# Patient Record
Sex: MALE | Race: BLACK OR AFRICAN AMERICAN | NOT HISPANIC OR LATINO | URBAN - METROPOLITAN AREA
[De-identification: names, ages, dates, MRNs, and addresses within clinical notes are randomized per-mention and may not be internally consistent; named-entity substitution may affect disease eponyms.]

---

## 2023-10-17 ENCOUNTER — PHARMACY VISIT (OUTPATIENT)
Dept: PHARMACY | Facility: CLINIC | Age: 53
End: 2023-10-17
Payer: MEDICAID

## 2023-10-17 ENCOUNTER — TELEPHONE (OUTPATIENT)
Dept: NEUROLOGY | Facility: CLINIC | Age: 53
End: 2023-10-17
Payer: COMMERCIAL

## 2023-10-17 DIAGNOSIS — G40.909 UNCLASSIFIED EPILEPTIC SEIZURES (MULTI): Primary | ICD-10-CM

## 2023-10-17 PROCEDURE — RXMED WILLOW AMBULATORY MEDICATION CHARGE

## 2023-10-17 RX ORDER — LACOSAMIDE 200 MG/1
TABLET, FILM COATED ORAL 2 TIMES DAILY
Qty: 60 TABLET | Refills: 2 | Status: SHIPPED | OUTPATIENT
Start: 2023-10-17 | End: 2023-12-08

## 2023-11-04 DIAGNOSIS — G40.909 EPILEPSY, UNSPECIFIED, NOT INTRACTABLE, WITHOUT STATUS EPILEPTICUS (MULTI): ICD-10-CM

## 2023-11-07 RX ORDER — LEVETIRACETAM 750 MG/1
1500 TABLET, FILM COATED ORAL 2 TIMES DAILY
Qty: 120 TABLET | Refills: 11 | Status: SHIPPED | OUTPATIENT
Start: 2023-11-07 | End: 2024-04-24 | Stop reason: SDUPTHER

## 2023-11-16 ENCOUNTER — PHARMACY VISIT (OUTPATIENT)
Dept: PHARMACY | Facility: CLINIC | Age: 53
End: 2023-11-16
Payer: MEDICAID

## 2023-11-16 PROCEDURE — RXMED WILLOW AMBULATORY MEDICATION CHARGE

## 2023-12-15 ENCOUNTER — PHARMACY VISIT (OUTPATIENT)
Dept: PHARMACY | Facility: CLINIC | Age: 53
End: 2023-12-15
Payer: MEDICAID

## 2023-12-15 DIAGNOSIS — G40.409 OTHER GENERALIZED EPILEPSY AND EPILEPTIC SYNDROMES, NOT INTRACTABLE, WITHOUT STATUS EPILEPTICUS (MULTI): Primary | ICD-10-CM

## 2023-12-15 PROCEDURE — RXMED WILLOW AMBULATORY MEDICATION CHARGE

## 2023-12-15 RX ORDER — LACOSAMIDE 200 MG/1
200 TABLET, FILM COATED ORAL 2 TIMES DAILY
Qty: 60 TABLET | Refills: 2 | Status: SHIPPED | OUTPATIENT
Start: 2023-12-15 | End: 2024-03-11 | Stop reason: SDUPTHER

## 2024-01-12 PROCEDURE — RXMED WILLOW AMBULATORY MEDICATION CHARGE

## 2024-01-13 ENCOUNTER — PHARMACY VISIT (OUTPATIENT)
Dept: PHARMACY | Facility: CLINIC | Age: 54
End: 2024-01-13
Payer: MEDICAID

## 2024-02-09 PROCEDURE — RXMED WILLOW AMBULATORY MEDICATION CHARGE

## 2024-02-10 ENCOUNTER — PHARMACY VISIT (OUTPATIENT)
Dept: PHARMACY | Facility: CLINIC | Age: 54
End: 2024-02-10
Payer: MEDICAID

## 2024-02-12 ENCOUNTER — OFFICE VISIT (OUTPATIENT)
Dept: NEUROLOGY | Facility: CLINIC | Age: 54
End: 2024-02-12
Payer: COMMERCIAL

## 2024-02-12 VITALS
DIASTOLIC BLOOD PRESSURE: 74 MMHG | HEIGHT: 72 IN | BODY MASS INDEX: 36.44 KG/M2 | WEIGHT: 269 LBS | HEART RATE: 78 BPM | SYSTOLIC BLOOD PRESSURE: 109 MMHG

## 2024-02-12 DIAGNOSIS — G40.909 NONINTRACTABLE EPILEPSY WITHOUT STATUS EPILEPTICUS, UNSPECIFIED EPILEPSY TYPE (MULTI): Primary | ICD-10-CM

## 2024-02-12 DIAGNOSIS — I69.90 LATE EFFECTS OF CEREBROVASCULAR DISEASE: ICD-10-CM

## 2024-02-12 PROCEDURE — 99214 OFFICE O/P EST MOD 30 MIN: CPT | Performed by: PSYCHIATRY & NEUROLOGY

## 2024-02-12 NOTE — PROGRESS NOTES
Subjective     Lex Funez is a 53 y.o. year old male seen in follow-up for epilepsy, remote history of subarachnoid hemorrhage and left MCA stroke.    HPI    53-year-old right-handed -American man with past medical history significant for hypertension, former smoking, migraine with visual aura. 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 He has had residual Broca-type aphasia with reasonably good comprehension and right spastic hemiparesis, but has maintained ambulation with or without a cane.     I have followed him for history of stroke, migraine, and complex partial epilepsy managed with Keppra and more recently the addition of Vimpat. He has seen Epilepsy as well. He has signed a controlled substance agreement for Vimpat.      I evaluated him most recently on 5/24/2023 in the office.  He is evaluated again today in the office, accompanied by his wife.    He has done well from the standpoint of epilepsy.  He has had no recognized convulsive seizures recently.  His wife comments on occasional brief episodes of staring, typically him looking out the window and not responding to her when she says something to him.  These last typically a few seconds and no more than 30 seconds and it is not clear to her whether it is simply inattention or actual seizure activity.    There have been no recent episodes of tongue bite or blood on the pillow.  He has had no recent falls.    He continues on levetiracetam 1500 mg twice daily and Vimpat 200 mg twice daily.  He indicates no effects of his medications.  He and his wife indicate that he is very compliant with his regimen.  He gets Vimpat through the [facility] and Keppra through [facility] but his wife is thinking of changing the Keppra to the [facility] pharmacy as well.    Denies recent headaches.    He reports excellent quality  of overnight sleep.    About the only thing that has been bothering him recently is intermittent low back pain, for which he uses IcyHot roll-on formulation.  His wife speculates that the back pain may be related to his gait.  He has a cane but prefers not to use it and this results in a significant circumduction.    Last signed the controlled substance agreement in May 2023 and was to have a urine drug screen at that time but it does not appear to have been done.    Review of Systems    As per the history of present illness    There is no problem list on file for this patient.    Past Medical History:   Diagnosis Date    Other conditions influencing health status 01/15/2014    Stroke syndrome    Personal history of other diseases of the circulatory system 01/15/2014    Personal history of subarachnoid hemorrhage     Past Surgical History:   Procedure Laterality Date    CT HEAD ANGIO W AND WO IV CONTRAST  3/26/2014    CT HEAD ANGIO W AND WO IV CONTRAST 3/26/2014 [facility] ANCILLARY LEGACY    OTHER SURGICAL HISTORY  05/04/2015    Brain Surgery     Social History     Tobacco Use    Smoking status: Not on file    Smokeless tobacco: Not on file   Substance Use Topics    Alcohol use: Not on file     family history is not on file.    Current Outpatient Medications:     Keppra 750 mg tablet, TAKE 2 TABLETS BY MOUTH TWICE A DAY, Disp: 120 tablet, Rfl: 11    Vimpat 200 mg tablet tablet, Take 1 tablet (200 mg) by mouth 2 times a day., Disp: 60 tablet, Rfl: 2  Not on File    Objective   Neurological Exam  Physical Exam    Physical Examination:    General: Alert man who was ambulatory with a single-point cane.  Masked due to pandemic.    Mental Status: Clear sensorium without fluctuation.  Appropriate in response to questions and instructions.  Baseline nonfluent aphasia with good comprehension; despite somewhat telegraphic speech she communicated well, as at previous visits.    Cranial Nerves: Exam was conducted with patient masked  due to pandemic.  No gaze deviation or ptosis.  Grossly intact hearing.  No dysarthria or dysphonia.    Motor: Baseline mild right hemiparesis as at previous evaluations.  At least 4+ strength throughout the right limbs and 5 throughout the left.    Station: Intact and stable.    Gait: Stable observed without his cane and notable again for a pronounced right circumduction.  Nonetheless he moved at a rapid pace.        Assessment/Plan     Appears neurologically stable.    From the standpoint of epilepsy he has had no recent episodes of concern; we discussed that occasional brief staring may simply be inattention.  He indicates excellent compliance with his regimen and good tolerance of his medications.  I did not advise any change today.    I advised his wife to remind the office of his next Keppra refill going through [facility] instead of [facility] as she expressed interest in a change.    I am asking him to have the urine drug screen for the CSA for Vimpat.  He is due to sign the CSA again in May.    Will also check CMP for routine monitoring.  The office will contact him regarding results.    He has had no interval strokelike events.  Blood pressure is excellent today.    He has had no recent headache activity.    We discussed his low back pain.  It is an intermittent bland phenomenon and I agree with his wife that it may relate at least in part to his circumduction gait.  However, he prefers not to use the cane much of the time, and did not like the idea of wearing an AFO to try to address the right equinus deformity.  I suggested he obtain IcyHot patches for application over the back.    I advised him to follow-up in 8 months.

## 2024-02-12 NOTE — PATIENT INSTRUCTIONS
I am glad to hear that you have been doing well.    We discussed getting the required urine drug screen for the CSA.  I am also ordering a metabolic panel for routine monitoring of your medications.  The office will call with results.    You will be due to sign the CSA again in May.    Please contact the office when the next refill of Keppra is due and we will order it through [facility] rather than [facility].  I did not make any dosage changes today.    Please see me in 8 months.

## 2024-03-11 DIAGNOSIS — G40.409 OTHER GENERALIZED EPILEPSY AND EPILEPTIC SYNDROMES, NOT INTRACTABLE, WITHOUT STATUS EPILEPTICUS (MULTI): ICD-10-CM

## 2024-03-11 PROCEDURE — RXMED WILLOW AMBULATORY MEDICATION CHARGE

## 2024-03-11 RX ORDER — LACOSAMIDE 200 MG/1
200 TABLET, FILM COATED ORAL 2 TIMES DAILY
Qty: 60 TABLET | Refills: 2 | Status: SHIPPED | OUTPATIENT
Start: 2024-03-11 | End: 2024-06-07 | Stop reason: SDUPTHER

## 2024-03-13 ENCOUNTER — PHARMACY VISIT (OUTPATIENT)
Dept: PHARMACY | Facility: CLINIC | Age: 54
End: 2024-03-13
Payer: MEDICAID

## 2024-04-08 PROCEDURE — RXMED WILLOW AMBULATORY MEDICATION CHARGE

## 2024-04-10 ENCOUNTER — PHARMACY VISIT (OUTPATIENT)
Dept: PHARMACY | Facility: CLINIC | Age: 54
End: 2024-04-10
Payer: MEDICAID

## 2024-04-24 DIAGNOSIS — G40.909 EPILEPSY, UNSPECIFIED, NOT INTRACTABLE, WITHOUT STATUS EPILEPTICUS (MULTI): ICD-10-CM

## 2024-04-24 RX ORDER — LEVETIRACETAM 750 MG/1
1500 TABLET, FILM COATED ORAL 2 TIMES DAILY
Qty: 120 TABLET | Refills: 11 | Status: SHIPPED | OUTPATIENT
Start: 2024-04-24

## 2024-04-24 RX ORDER — LEVETIRACETAM 750 MG/1
1500 TABLET, FILM COATED ORAL 2 TIMES DAILY
Qty: 120 TABLET | Refills: 2 | Status: CANCELLED | OUTPATIENT
Start: 2024-04-24

## 2024-04-24 NOTE — TELEPHONE ENCOUNTER
----- Message from Erika Gillis sent at 4/24/2024  3:45 PM EDT -----  Needs a refill on [place], pharmacy is [facility] on [address] and [address], daughter (Vasquez) is calling this information in call back [phone].

## 2024-05-10 PROCEDURE — RXMED WILLOW AMBULATORY MEDICATION CHARGE

## 2024-05-12 ENCOUNTER — PHARMACY VISIT (OUTPATIENT)
Dept: PHARMACY | Facility: CLINIC | Age: 54
End: 2024-05-12
Payer: MEDICAID

## 2024-06-07 DIAGNOSIS — G40.409 OTHER GENERALIZED EPILEPSY AND EPILEPTIC SYNDROMES, NOT INTRACTABLE, WITHOUT STATUS EPILEPTICUS (MULTI): ICD-10-CM

## 2024-06-07 PROCEDURE — RXMED WILLOW AMBULATORY MEDICATION CHARGE

## 2024-06-07 RX ORDER — LACOSAMIDE 200 MG/1
200 TABLET, FILM COATED ORAL 2 TIMES DAILY
Qty: 180 TABLET | Refills: 2 | Status: SHIPPED | OUTPATIENT
Start: 2024-06-07

## 2024-06-12 ENCOUNTER — PHARMACY VISIT (OUTPATIENT)
Dept: PHARMACY | Facility: CLINIC | Age: 54
End: 2024-06-12
Payer: MEDICAID

## 2024-09-06 DIAGNOSIS — G40.409 OTHER GENERALIZED EPILEPSY AND EPILEPTIC SYNDROMES, NOT INTRACTABLE, WITHOUT STATUS EPILEPTICUS (MULTI): ICD-10-CM

## 2024-09-06 PROCEDURE — RXMED WILLOW AMBULATORY MEDICATION CHARGE

## 2024-09-06 RX ORDER — LACOSAMIDE 200 MG/1
200 TABLET, FILM COATED ORAL 2 TIMES DAILY
Qty: 180 TABLET | Refills: 2 | Status: SHIPPED | OUTPATIENT
Start: 2024-09-06

## 2024-09-09 ENCOUNTER — PHARMACY VISIT (OUTPATIENT)
Dept: PHARMACY | Facility: CLINIC | Age: 54
End: 2024-09-09
Payer: MEDICAID

## 2024-10-18 ENCOUNTER — APPOINTMENT (OUTPATIENT)
Dept: NEUROLOGY | Facility: CLINIC | Age: 54
End: 2024-10-18
Payer: COMMERCIAL

## 2024-10-18 VITALS
BODY MASS INDEX: 36.48 KG/M2 | DIASTOLIC BLOOD PRESSURE: 76 MMHG | HEIGHT: 72 IN | SYSTOLIC BLOOD PRESSURE: 112 MMHG | HEART RATE: 87 BPM

## 2024-10-18 DIAGNOSIS — I69.30 HISTORY OF STROKE WITH RESIDUAL DEFICIT: ICD-10-CM

## 2024-10-18 DIAGNOSIS — G40.909 NONINTRACTABLE EPILEPSY WITHOUT STATUS EPILEPTICUS, UNSPECIFIED EPILEPSY TYPE (MULTI): Primary | ICD-10-CM

## 2024-10-18 PROCEDURE — 99214 OFFICE O/P EST MOD 30 MIN: CPT | Performed by: PSYCHIATRY & NEUROLOGY

## 2024-10-18 NOTE — PROGRESS NOTES
Subjective     Lex Funez is a 54 y.o. year old male seen in follow-up for epilepsy; remote history of subarachnoid hemorrhage and left MCA stroke.    HPI    54-year-old right-handed man with past medical history significant for hypertension, former smoking, migraine with visual aura. 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 He has had residual Broca-type aphasia with reasonably good comprehension and right spastic hemiparesis, but has maintained ambulation with or without a cane.     I have followed him for history of stroke, migraine, and complex partial epilepsy managed with Keppra and more recently the addition of Vimpat. He has seen Epilepsy as well. He has signed a controlled substance agreement for Vimpat.      I evaluated him most recently on 2/12/2024 in the office.  He was doing well at that time with no definite interval seizures; occasional brief staring spells potentially just representing inattention.  He endorsed low back pain in the context of a circumduction gait.  I suggested consideration of an AFO but he did not wish to consider it.  I recommended trying an OTC patch (such as IcyHot) over the back.    He is evaluated again today in the office, again accompanied by his wife.    His wife indicates no definite interval seizures.  He continues to have occasional episodes of staring for up to 30-40 seconds, typically looking out the window.  He says he is looking at birds but his wife is not sure if these might represent subtle seizures.  There are however no associated automatisms such as blinking, lip smacking or unusual postures of the limbs.    He continues on a stable anticonvulsant regimen of Keppra 1500 mg twice daily and Vimpat 200 mg twice daily.  He feels mildly drowsy after his anticonvulsant doses but is otherwise tolerating them without noted  side effects.    He endorses no significant headaches recently.  He denies visual complaints.    He experiences intermittent low back pain which his wife suspects relates at least in part to his circumduction gait.  He uses an IcyHot topical application with some relief.  He does not want to wear an orthotic on the right ankle.    He remains ambulatory with a cane, which he tends to carry rather than rely upon, and denies interval falls.    He reports good quality of sleep.    Review of Systems    As per the history of present illness    There is no problem list on file for this patient.    Past Medical History:   Diagnosis Date    Other conditions influencing health status 01/15/2014    Stroke syndrome    Personal history of other diseases of the circulatory system 01/15/2014    Personal history of subarachnoid hemorrhage     Past Surgical History:   Procedure Laterality Date    CT HEAD ANGIO W AND WO IV CONTRAST  3/26/2014    CT HEAD ANGIO W AND WO IV CONTRAST 3/26/2014 [facility] ANCILLARY LEGACY    OTHER SURGICAL HISTORY  05/04/2015    Brain Surgery     Social History     Tobacco Use    Smoking status: Former     Types: Cigarettes    Smokeless tobacco: Not on file   Substance Use Topics    Alcohol use: Not on file     family history is not on file.    Current Outpatient Medications:     Keppra 750 mg tablet, Take 2 tablets (1,500 mg) by mouth 2 times a day., Disp: 120 tablet, Rfl: 11    Vimpat 200 mg tablet tablet, Take 1 tablet (200 mg) by mouth 2 times a day., Disp: 180 tablet, Rfl: 2  No Known Allergies    Objective   Neurological Exam  Physical Exam    Physical Examination:    General: Alert man who was ambulatory with a single-point cane.  Masked.    Mental Status: Clear sensorium without fluctuation.  Moderate nonfluent aphasia as per baseline but with good comprehension of questions and instructions.  Intelligible although relatively telegraphic responses.    Cranial Nerves: Exam was conducted with patient  masked due to pandemic.  Funduscopic exam was  not well visualized bilaterally on nondilated exam.  Pupils were equal, round and reactive to light with no relative afferent pupillary defect.  Extraocular movements were intact and conjugate without nystagmus.  No ptosis.  Visual fields were full to confrontation tested binocularly.  Forehead and periocular facial motor function was symmetrically intact, but perioral facial motor function was not assessed due to mask.  Hearing was grossly intact.  No dysarthria.  Shoulder shrug was symmetric.  Tongue protrusion was not assessed due to mask.    Motor: There was mild right upper extremity drift and stable baseline right hemiparesis.  No evident weakness of left limbs.    Coordination: No postural or rest tremor, myoclonus or dystonic posturing.    Gait: Stable observed with cane, with him carrying rather than relying on the cane.  Again notable for right circumduction.  He moved at a moderately rapid pace.      Assessment/Plan     He appears neurologically stable.    He has not had recognized overt seizures.  He continues to have occasional brief staring episodes that might represent subtle seizure activity but without associated automatisms.  He is a nondriver.    I am referring him to Epilepsy for ongoing management.    He did not require refills of his anticonvulsant regimen at this visit.  I did not recommend changes today.    His wife expressed interest in him being referred for speech therapy to work further on communication strategies in light of a history of MCA stroke with residual nonfluent aphasia.  I provided a referral.

## 2024-10-18 NOTE — PATIENT INSTRUCTIONS
I'm glad to hear that you have been doing well.  Your exam is stable.    As we discussed I am providing a referral to speech therapy.    I did not change medication doses today.  It looks as if you have ample refills of both medications for the time being.    I am providing a referral to Dr. Lucas, epilepsy subspecialist, for ongoing care.

## 2024-10-31 DIAGNOSIS — G40.909 EPILEPSY, UNSPECIFIED, NOT INTRACTABLE, WITHOUT STATUS EPILEPTICUS: ICD-10-CM

## 2024-11-01 RX ORDER — LEVETIRACETAM 750 MG/1
1500 TABLET, FILM COATED ORAL 2 TIMES DAILY
Qty: 120 TABLET | Refills: 11 | Status: SHIPPED | OUTPATIENT
Start: 2024-11-01

## 2024-12-02 PROCEDURE — RXMED WILLOW AMBULATORY MEDICATION CHARGE

## 2024-12-07 ENCOUNTER — PHARMACY VISIT (OUTPATIENT)
Dept: PHARMACY | Facility: CLINIC | Age: 54
End: 2024-12-07
Payer: MEDICAID

## 2025-01-03 PROCEDURE — RXMED WILLOW AMBULATORY MEDICATION CHARGE

## 2025-01-04 ENCOUNTER — PHARMACY VISIT (OUTPATIENT)
Dept: PHARMACY | Facility: CLINIC | Age: 55
End: 2025-01-04
Payer: MEDICAID

## 2025-02-03 ENCOUNTER — PHARMACY VISIT (OUTPATIENT)
Dept: PHARMACY | Facility: CLINIC | Age: 55
End: 2025-02-03
Payer: MEDICAID

## 2025-02-03 PROCEDURE — RXMED WILLOW AMBULATORY MEDICATION CHARGE

## 2025-02-17 ENCOUNTER — OFFICE VISIT (OUTPATIENT)
Dept: NEUROLOGY | Facility: HOSPITAL | Age: 55
End: 2025-02-17
Payer: COMMERCIAL

## 2025-02-17 VITALS
HEART RATE: 91 BPM | HEIGHT: 72 IN | RESPIRATION RATE: 18 BRPM | TEMPERATURE: 96.8 F | WEIGHT: 288 LBS | DIASTOLIC BLOOD PRESSURE: 80 MMHG | SYSTOLIC BLOOD PRESSURE: 129 MMHG | BODY MASS INDEX: 39.01 KG/M2

## 2025-02-17 DIAGNOSIS — G40.109 FOCAL EPILEPSY (MULTI): Primary | ICD-10-CM

## 2025-02-17 DIAGNOSIS — G40.909 NONINTRACTABLE EPILEPSY WITHOUT STATUS EPILEPTICUS, UNSPECIFIED EPILEPSY TYPE (MULTI): ICD-10-CM

## 2025-02-17 DIAGNOSIS — I69.351: ICD-10-CM

## 2025-02-17 DIAGNOSIS — R68.3 CLUBBING OF FINGERS: ICD-10-CM

## 2025-02-17 DIAGNOSIS — G40.909 EPILEPSY, UNSPECIFIED, NOT INTRACTABLE, WITHOUT STATUS EPILEPTICUS: ICD-10-CM

## 2025-02-17 PROCEDURE — 3008F BODY MASS INDEX DOCD: CPT | Performed by: STUDENT IN AN ORGANIZED HEALTH CARE EDUCATION/TRAINING PROGRAM

## 2025-02-17 PROCEDURE — 99215 OFFICE O/P EST HI 40 MIN: CPT | Performed by: STUDENT IN AN ORGANIZED HEALTH CARE EDUCATION/TRAINING PROGRAM

## 2025-02-17 PROCEDURE — 99205 OFFICE O/P NEW HI 60 MIN: CPT | Performed by: STUDENT IN AN ORGANIZED HEALTH CARE EDUCATION/TRAINING PROGRAM

## 2025-02-17 RX ORDER — AMLODIPINE BESYLATE 5 MG/1
5 TABLET ORAL DAILY
COMMUNITY

## 2025-02-17 RX ORDER — ATORVASTATIN CALCIUM 80 MG/1
80 TABLET, FILM COATED ORAL DAILY
COMMUNITY

## 2025-02-17 RX ORDER — LEVETIRACETAM 750 MG/1
1500 TABLET ORAL 2 TIMES DAILY
Qty: 360 TABLET | Refills: 3 | Status: SHIPPED | OUTPATIENT
Start: 2025-02-17 | End: 2026-02-17

## 2025-02-17 RX ORDER — LACOSAMIDE 200 MG/1
1 TABLET ORAL 2 TIMES DAILY
COMMUNITY
Start: 2015-04-27 | End: 2025-02-17 | Stop reason: SDUPTHER

## 2025-02-17 RX ORDER — ERGOCALCIFEROL 1.25 MG/1
50000 CAPSULE ORAL
COMMUNITY
Start: 2024-08-23

## 2025-02-17 RX ORDER — SOLIFENACIN SUCCINATE 10 MG/1
10 TABLET, FILM COATED ORAL
COMMUNITY
Start: 2014-01-15

## 2025-02-17 RX ORDER — TAMSULOSIN HYDROCHLORIDE 0.4 MG/1
1 CAPSULE ORAL NIGHTLY
COMMUNITY
Start: 2024-08-23

## 2025-02-17 RX ORDER — LOSARTAN POTASSIUM 100 MG/1
100 TABLET ORAL DAILY
COMMUNITY

## 2025-02-17 RX ORDER — ALBUTEROL SULFATE 90 UG/1
2 INHALANT RESPIRATORY (INHALATION) EVERY 4 HOURS PRN
COMMUNITY
Start: 2024-05-24

## 2025-02-17 RX ORDER — OXYBUTYNIN CHLORIDE 10 MG/1
10 TABLET, EXTENDED RELEASE ORAL
COMMUNITY
Start: 2017-09-28

## 2025-02-17 RX ORDER — LACOSAMIDE 200 MG/1
200 TABLET ORAL 2 TIMES DAILY
Qty: 60 TABLET | Refills: 5 | Status: SHIPPED | OUTPATIENT
Start: 2025-02-17 | End: 2026-02-17

## 2025-02-17 RX ORDER — LEVETIRACETAM 750 MG/1
2 TABLET ORAL 2 TIMES DAILY
COMMUNITY
Start: 2014-01-15 | End: 2025-02-17 | Stop reason: SDUPTHER

## 2025-02-17 ASSESSMENT — PAIN SCALES - GENERAL: PAINLEVEL_OUTOF10: 0-NO PAIN

## 2025-02-17 NOTE — PATIENT INSTRUCTIONS
Dear Mr. Funez    It was a pleasure to see you in clinic today. I am glad your seizures are well controlled. We are going to obtain an EEG to establish a new baseline. Please call [phone] to schedule the test. In addition, we are also going to check the blood levels of the medications.    I am concerned about your finger nail clubbing. This is usually a sign of chronic lung or heart disease. I strongly advise you to work toward quitting smoking. You can use over the counter nicotine gums or lozenges to see if it helps you. If it doesn't, please ask your primary care doctor for a nicotine patch prescription. Meanwhile, I have ordered an echocardiogram to assess your heart function.     I will see you again in 6 months.

## 2025-02-18 NOTE — PROGRESS NOTES
"Subjective     Lex Funez is a 54 y.o. year old left-handed male who presents for to establish care for epilepsy. He is a former patient of Dr. Altman.    4-D CLASSIFICATION:  Type: EPE  Semiology: Bilateral motor sz      Lateralizing signs: Unknown      Diagnostic signs: Unknown      Frequency: None in many years  EZ: Left hemisphere  Etiology: Old stroke  RMC: Baseline expressive aphasia, right spastic hemiparesis, S/P left anterior choroidal artery aneurysm clipping c/b stroke    Current AEDs: LEV 1500mg BID, LAC 200mg BID  Past AEDs: Unknown      HPI  SUMMARY:  He is a long term patient of Dr. Altman, who is now leaving practice. He has had stable focal epilepsy for many years. It seems that he hasn't had any motor seizures in a long time. The wife can recall the seizures vaguely and describes them as bilateral arm stiffening or shaking with loss of consciousness.     Dr. Altman's notes mention that he has had some staring spells but the wife says that he is responsive during those \"spells\" and he is usually actually just staring at something.       Past Medical History:   Diagnosis Date    Other conditions influencing health status 01/15/2014    Stroke syndrome    Personal history of other diseases of the circulatory system 01/15/2014    Personal history of subarachnoid hemorrhage     Past Surgical History:   Procedure Laterality Date    CT HEAD ANGIO W AND WO IV CONTRAST  3/26/2014    CT HEAD ANGIO W AND WO IV CONTRAST 3/26/2014 [facility] ANCILLARY LEGACY    OTHER SURGICAL HISTORY  05/04/2015    Brain Surgery     Social History     Tobacco Use    Smoking status: Former     Types: Cigarettes    Smokeless tobacco: Not on file   Substance Use Topics    Alcohol use: Not on file     No family history on file.    Current Outpatient Medications:     albuterol 90 mcg/actuation inhaler, Inhale 2 puffs every 4 hours if needed., Disp: , Rfl:     ergocalciferol (Vitamin D-2) 1.25 MG (01645 UT) capsule, Take 1 capsule (50,000 " Units) by mouth every 14 (fourteen) days., Disp: , Rfl:     oxybutynin XL (Ditropan-XL) 10 mg 24 hr tablet, Take 1 tablet (10 mg) by mouth., Disp: , Rfl:     solifenacin (Vesicare) 10 mg tablet, Take 1 tablet (10 mg) by mouth., Disp: , Rfl:     tamsulosin (Flomax) 0.4 mg 24 hr capsule, Take 1 capsule (0.4 mg) by mouth once daily at bedtime., Disp: , Rfl:     amLODIPine (Norvasc) 5 mg tablet, Take 1 tablet (5 mg) by mouth once daily., Disp: , Rfl:     atorvastatin (Lipitor) 80 mg tablet, Take 1 tablet (80 mg) by mouth once daily., Disp: , Rfl:     levETIRAcetam (Keppra) 750 mg tablet, Take 2 tablets (1,500 mg) by mouth 2 times a day., Disp: 360 tablet, Rfl: 3    lacosamide (Vimpat) 200 mg tablet tablet, Take 1 tablet (200 mg) by mouth 2 times a day., Disp: 60 tablet, Rfl: 5    losartan (Cozaar) 100 mg tablet, Take 1 tablet (100 mg) by mouth once daily., Disp: , Rfl:   No Known Allergies    Review of Systems  As per HPI, otherwise all other systems have been reviewed are negative for complaint.      Objective   /80   Pulse 91   Temp 36 °C (96.8 °F)   Resp 18   Ht 1.829 m (6')   Wt 131 kg (288 lb)   BMI 39.06 kg/m²     Neurological Exam  Physical Exam  Awake, alert, interactive, pleasant  Follows commands  Speech output hesitant, can say few words, frequent paraphasic errors  Gaze midline  Smile symmetric  Right arm spastic with action dystonia right hand on posture  Right arm strength 4-/5 proximal and 3/5 distal    Bilateral finger nail clubbing +nt         Assessment/Plan   Lex Funez is a 54 y.o. year old left-handed male who presents for to establish care for epilepsy. He is a former patient of Dr. Altman.    4-D CLASSIFICATION:  Type: EPE  Semiology: Bilateral motor sz      Lateralizing signs: Unknown      Diagnostic signs: Unknown      Frequency: None in many years  EZ: Left hemisphere  Etiology: Old stroke  RMC: Baseline expressive aphasia, right spastic hemiparesis, S/P left anterior choroidal  artery aneurysm clipping c/b stroke    Current AEDs: LEV 1500mg BID, LAC 200mg BID  Past AEDs: Unknown    Plan:  - As far as his epilepsy is concerned, it seems to be under reasonable control. We are going to obtain a baseline routine EEG.  - Check LAC and LEV levels.  - Refill meds.  - I am going to obtain a TTE as well. He is a chronic smoker and snores at night. He has bilateral finger nail clubbing on exam. If the TTE does not demonstrate elevated right sided pressures or elevated PA pressure, then we should obtain chest CT with or without PFT.  - I have counseled him on cessation of smoking and use OTC nicotine gums or lozenges.    F/U in 4-5 months.    Kenyon Lucas MD  [facility]    The total appointment time today was 60 minutes. Time included preparing to see the patient, obtaining the history, performing a medically necessary appropriate physical examination, counseling and educating the patient/family/caregiver, ordering medications, tests and procedures, referring and communicating with other providers, independently interpreting results and communicating the results to the patient/family/caregiver, care coordination] and documenting clinical information in the medical record.

## 2025-02-22 ENCOUNTER — PHARMACY VISIT (OUTPATIENT)
Dept: PHARMACY | Facility: CLINIC | Age: 55
End: 2025-02-22
Payer: MEDICAID

## 2025-02-24 PROCEDURE — RXMED WILLOW AMBULATORY MEDICATION CHARGE

## 2025-02-27 ENCOUNTER — PHARMACY VISIT (OUTPATIENT)
Dept: PHARMACY | Facility: CLINIC | Age: 55
End: 2025-02-27
Payer: MEDICAID

## 2025-03-10 ENCOUNTER — PHARMACY VISIT (OUTPATIENT)
Dept: PHARMACY | Facility: CLINIC | Age: 55
End: 2025-03-10
Payer: MEDICAID

## 2025-03-10 PROCEDURE — RXMED WILLOW AMBULATORY MEDICATION CHARGE

## 2025-03-19 PROCEDURE — RXMED WILLOW AMBULATORY MEDICATION CHARGE

## 2025-03-26 ENCOUNTER — PHARMACY VISIT (OUTPATIENT)
Dept: PHARMACY | Facility: CLINIC | Age: 55
End: 2025-03-26
Payer: MEDICAID

## 2025-04-06 PROCEDURE — RXMED WILLOW AMBULATORY MEDICATION CHARGE

## 2025-04-08 ENCOUNTER — ANCILLARY PROCEDURE (OUTPATIENT)
Dept: CARDIOLOGY | Facility: CLINIC | Age: 55
End: 2025-04-08
Payer: COMMERCIAL

## 2025-04-08 DIAGNOSIS — R68.3 CLUBBING OF FINGERS: ICD-10-CM

## 2025-04-08 LAB
AORTIC VALVE MEAN GRADIENT: 7 MMHG
AORTIC VALVE PEAK VELOCITY: 1.81 M/S
AV PEAK GRADIENT: 13 MMHG
AVA (PEAK VEL): 3.49 CM2
AVA (VTI): 3.12 CM2
EJECTION FRACTION APICAL 4 CHAMBER: 69.4
EJECTION FRACTION: 65 %
LEFT ATRIUM VOLUME AREA LENGTH INDEX BSA: 41.4 ML/M2
LEFT VENTRICLE INTERNAL DIMENSION DIASTOLE: 4.08 CM (ref 3.5–6)
LEFT VENTRICULAR OUTFLOW TRACT DIAMETER: 2.28 CM
MITRAL VALVE E/A RATIO: 0.73
RIGHT VENTRICLE FREE WALL PEAK S': 14 CM/S
TRICUSPID ANNULAR PLANE SYSTOLIC EXCURSION: 2.4 CM

## 2025-04-08 PROCEDURE — 2500000004 HC RX 250 GENERAL PHARMACY W/ HCPCS (ALT 636 FOR OP/ED): Performed by: STUDENT IN AN ORGANIZED HEALTH CARE EDUCATION/TRAINING PROGRAM

## 2025-04-08 PROCEDURE — 93321 DOPPLER ECHO F-UP/LMTD STD: CPT | Performed by: INTERNAL MEDICINE

## 2025-04-08 PROCEDURE — 93308 TTE F-UP OR LMTD: CPT | Performed by: INTERNAL MEDICINE

## 2025-04-08 PROCEDURE — 93325 DOPPLER ECHO COLOR FLOW MAPG: CPT | Performed by: INTERNAL MEDICINE

## 2025-04-08 PROCEDURE — 93325 DOPPLER ECHO COLOR FLOW MAPG: CPT

## 2025-04-08 RX ADMIN — PERFLUTREN 2 ML OF DILUTION: 6.52 INJECTION, SUSPENSION INTRAVENOUS at 08:42

## 2025-04-09 ENCOUNTER — PHARMACY VISIT (OUTPATIENT)
Dept: PHARMACY | Facility: CLINIC | Age: 55
End: 2025-04-09
Payer: MEDICAID

## 2025-04-26 PROCEDURE — RXMED WILLOW AMBULATORY MEDICATION CHARGE

## 2025-04-28 ENCOUNTER — PHARMACY VISIT (OUTPATIENT)
Dept: PHARMACY | Facility: CLINIC | Age: 55
End: 2025-04-28
Payer: MEDICAID

## 2025-05-07 ENCOUNTER — PHARMACY VISIT (OUTPATIENT)
Dept: PHARMACY | Facility: CLINIC | Age: 55
End: 2025-05-07
Payer: MEDICAID

## 2025-05-07 PROCEDURE — RXMED WILLOW AMBULATORY MEDICATION CHARGE

## 2025-05-24 PROCEDURE — RXMED WILLOW AMBULATORY MEDICATION CHARGE

## 2025-05-28 ENCOUNTER — PHARMACY VISIT (OUTPATIENT)
Dept: PHARMACY | Facility: CLINIC | Age: 55
End: 2025-05-28
Payer: MEDICAID

## 2025-06-06 PROCEDURE — RXMED WILLOW AMBULATORY MEDICATION CHARGE

## 2025-06-07 ENCOUNTER — PHARMACY VISIT (OUTPATIENT)
Dept: PHARMACY | Facility: CLINIC | Age: 55
End: 2025-06-07
Payer: MEDICAID

## 2025-06-23 ENCOUNTER — PHARMACY VISIT (OUTPATIENT)
Dept: PHARMACY | Facility: CLINIC | Age: 55
End: 2025-06-23
Payer: MEDICAID

## 2025-06-23 PROCEDURE — RXMED WILLOW AMBULATORY MEDICATION CHARGE

## 2025-07-05 ENCOUNTER — PHARMACY VISIT (OUTPATIENT)
Dept: PHARMACY | Facility: CLINIC | Age: 55
End: 2025-07-05
Payer: MEDICAID

## 2025-07-05 PROCEDURE — RXMED WILLOW AMBULATORY MEDICATION CHARGE

## 2025-07-24 PROCEDURE — RXMED WILLOW AMBULATORY MEDICATION CHARGE

## 2025-07-28 ENCOUNTER — PHARMACY VISIT (OUTPATIENT)
Dept: PHARMACY | Facility: CLINIC | Age: 55
End: 2025-07-28
Payer: MEDICAID

## 2025-08-02 PROCEDURE — RXMED WILLOW AMBULATORY MEDICATION CHARGE

## 2025-08-04 ENCOUNTER — PHARMACY VISIT (OUTPATIENT)
Dept: PHARMACY | Facility: CLINIC | Age: 55
End: 2025-08-04
Payer: MEDICAID

## 2025-08-18 ENCOUNTER — OFFICE VISIT (OUTPATIENT)
Dept: NEUROLOGY | Facility: HOSPITAL | Age: 55
End: 2025-08-18

## 2025-08-18 VITALS
SYSTOLIC BLOOD PRESSURE: 121 MMHG | HEART RATE: 90 BPM | DIASTOLIC BLOOD PRESSURE: 80 MMHG | RESPIRATION RATE: 18 BRPM | HEIGHT: 72 IN | WEIGHT: 279 LBS | TEMPERATURE: 97.1 F | BODY MASS INDEX: 37.79 KG/M2

## 2025-08-18 DIAGNOSIS — G40.109 FOCAL EPILEPSY (MULTI): ICD-10-CM

## 2025-08-18 PROCEDURE — 99212 OFFICE O/P EST SF 10 MIN: CPT

## 2025-08-18 PROCEDURE — 99214 OFFICE O/P EST MOD 30 MIN: CPT | Performed by: STUDENT IN AN ORGANIZED HEALTH CARE EDUCATION/TRAINING PROGRAM

## 2025-08-18 PROCEDURE — 3008F BODY MASS INDEX DOCD: CPT | Performed by: STUDENT IN AN ORGANIZED HEALTH CARE EDUCATION/TRAINING PROGRAM

## 2025-08-18 RX ORDER — LACOSAMIDE 200 MG/1
200 TABLET, FILM COATED ORAL 2 TIMES DAILY
Qty: 180 TABLET | Refills: 1 | Status: SHIPPED | OUTPATIENT
Start: 2025-08-18 | End: 2026-02-14

## 2025-08-18 ASSESSMENT — PAIN SCALES - GENERAL: PAINLEVEL_OUTOF10: 0-NO PAIN

## 2025-08-23 ENCOUNTER — PHARMACY VISIT (OUTPATIENT)
Dept: PHARMACY | Facility: CLINIC | Age: 55
End: 2025-08-23
Payer: MEDICAID

## 2025-08-23 PROCEDURE — RXMED WILLOW AMBULATORY MEDICATION CHARGE

## 2025-08-27 ENCOUNTER — PHARMACY VISIT (OUTPATIENT)
Dept: PHARMACY | Facility: CLINIC | Age: 55
End: 2025-08-27

## 2025-09-01 PROCEDURE — RXMED WILLOW AMBULATORY MEDICATION CHARGE

## 2025-09-02 ENCOUNTER — PHARMACY VISIT (OUTPATIENT)
Dept: PHARMACY | Facility: CLINIC | Age: 55
End: 2025-09-02
Payer: MEDICAID